# Patient Record
Sex: FEMALE | Race: WHITE
[De-identification: names, ages, dates, MRNs, and addresses within clinical notes are randomized per-mention and may not be internally consistent; named-entity substitution may affect disease eponyms.]

---

## 2018-03-31 ENCOUNTER — HOSPITAL ENCOUNTER (INPATIENT)
Dept: HOSPITAL 62 - ER | Age: 63
LOS: 3 days | Discharge: HOME HEALTH | DRG: 494 | End: 2018-04-03
Attending: ORTHOPAEDIC SURGERY | Admitting: ORTHOPAEDIC SURGERY
Payer: COMMERCIAL

## 2018-03-31 DIAGNOSIS — Z79.01: ICD-10-CM
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
## 2018-03-31 DIAGNOSIS — I49.3: ICD-10-CM

## 2018-03-31 DIAGNOSIS — Z23: ICD-10-CM

## 2018-03-31 DIAGNOSIS — S82.851B: Primary | ICD-10-CM

## 2018-03-31 DIAGNOSIS — Z87.891: ICD-10-CM
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
## 2018-03-31 DIAGNOSIS — K21.9: ICD-10-CM

## 2018-03-31 LAB
ADD MANUAL DIFF: NO
ALBUMIN SERPL-MCNC: 4.9 G/DL (ref 3.5–5)
ALP SERPL-CCNC: 86 U/L (ref 38–126)
ALT SERPL-CCNC: 52 U/L (ref 9–52)
ANION GAP SERPL CALC-SCNC: 15 MMOL/L (ref 5–19)
APTT BLD: 25.8 SEC (ref 23.5–35.8)
AST SERPL-CCNC: 38 U/L (ref 14–36)
BASOPHILS # BLD AUTO: 0.1 10^3/UL (ref 0–0.2)
BASOPHILS NFR BLD AUTO: 0.5 % (ref 0–2)
BILIRUB DIRECT SERPL-MCNC: 0.2 MG/DL (ref 0–0.4)
BILIRUB SERPL-MCNC: 0.3 MG/DL (ref 0.2–1.3)
BUN SERPL-MCNC: 14 MG/DL (ref 7–20)
CALCIUM: 8.9 MG/DL (ref 8.4–10.2)
CHLORIDE SERPL-SCNC: 102 MMOL/L (ref 98–107)
CO2 SERPL-SCNC: 26 MMOL/L (ref 22–30)
EOSINOPHIL # BLD AUTO: 0.1 10^3/UL (ref 0–0.6)
EOSINOPHIL NFR BLD AUTO: 1 % (ref 0–6)
ERYTHROCYTE [DISTWIDTH] IN BLOOD BY AUTOMATED COUNT: 12.7 % (ref 11.5–14)
GLUCOSE SERPL-MCNC: 119 MG/DL (ref 75–110)
HCT VFR BLD CALC: 40.9 % (ref 36–47)
HGB BLD-MCNC: 13.8 G/DL (ref 12–15.5)
INR PPP: 0.94
LYMPHOCYTES # BLD AUTO: 2.5 10^3/UL (ref 0.5–4.7)
LYMPHOCYTES NFR BLD AUTO: 20 % (ref 13–45)
MCH RBC QN AUTO: 31.3 PG (ref 27–33.4)
MCHC RBC AUTO-ENTMCNC: 33.8 G/DL (ref 32–36)
MCV RBC AUTO: 93 FL (ref 80–97)
MONOCYTES # BLD AUTO: 0.9 10^3/UL (ref 0.1–1.4)
MONOCYTES NFR BLD AUTO: 7.3 % (ref 3–13)
NEUTROPHILS # BLD AUTO: 8.7 10^3/UL (ref 1.7–8.2)
NEUTS SEG NFR BLD AUTO: 71.2 % (ref 42–78)
PLATELET # BLD: 251 10^3/UL (ref 150–450)
POTASSIUM SERPL-SCNC: 4.6 MMOL/L (ref 3.6–5)
PROT SERPL-MCNC: 7.4 G/DL (ref 6.3–8.2)
PROTHROMBIN TIME: 13.2 SEC (ref 11.4–15.4)
RBC # BLD AUTO: 4.42 10^6/UL (ref 3.72–5.28)
SODIUM SERPL-SCNC: 143.3 MMOL/L (ref 137–145)
TOTAL CELLS COUNTED % (AUTO): 100 %
WBC # BLD AUTO: 12.2 10^3/UL (ref 4–10.5)

## 2018-03-31 PROCEDURE — 96365 THER/PROPH/DIAG IV INF INIT: CPT

## 2018-03-31 PROCEDURE — C1769 GUIDE WIRE: HCPCS

## 2018-03-31 PROCEDURE — 99285 EMERGENCY DEPT VISIT HI MDM: CPT

## 2018-03-31 PROCEDURE — 0QSJ04Z REPOSITION RIGHT FIBULA WITH INTERNAL FIXATION DEVICE, OPEN APPROACH: ICD-10-PCS | Performed by: ORTHOPAEDIC SURGERY

## 2018-03-31 PROCEDURE — 85027 COMPLETE CBC AUTOMATED: CPT

## 2018-03-31 PROCEDURE — 82550 ASSAY OF CK (CPK): CPT

## 2018-03-31 PROCEDURE — 85730 THROMBOPLASTIN TIME PARTIAL: CPT

## 2018-03-31 PROCEDURE — 83735 ASSAY OF MAGNESIUM: CPT

## 2018-03-31 PROCEDURE — 96376 TX/PRO/DX INJ SAME DRUG ADON: CPT

## 2018-03-31 PROCEDURE — 80048 BASIC METABOLIC PNL TOTAL CA: CPT

## 2018-03-31 PROCEDURE — 3E0234Z INTRODUCTION OF SERUM, TOXOID AND VACCINE INTO MUSCLE, PERCUTANEOUS APPROACH: ICD-10-PCS

## 2018-03-31 PROCEDURE — C1713 ANCHOR/SCREW BN/BN,TIS/BN: HCPCS

## 2018-03-31 PROCEDURE — 90471 IMMUNIZATION ADMIN: CPT

## 2018-03-31 PROCEDURE — 90715 TDAP VACCINE 7 YRS/> IM: CPT

## 2018-03-31 PROCEDURE — 0QSG04Z REPOSITION RIGHT TIBIA WITH INTERNAL FIXATION DEVICE, OPEN APPROACH: ICD-10-PCS | Performed by: ORTHOPAEDIC SURGERY

## 2018-03-31 PROCEDURE — 96361 HYDRATE IV INFUSION ADD-ON: CPT

## 2018-03-31 PROCEDURE — 80053 COMPREHEN METABOLIC PANEL: CPT

## 2018-03-31 PROCEDURE — 96375 TX/PRO/DX INJ NEW DRUG ADDON: CPT

## 2018-03-31 PROCEDURE — 93010 ELECTROCARDIOGRAM REPORT: CPT

## 2018-03-31 PROCEDURE — 94799 UNLISTED PULMONARY SVC/PX: CPT

## 2018-03-31 PROCEDURE — 82553 CREATINE MB FRACTION: CPT

## 2018-03-31 PROCEDURE — 85610 PROTHROMBIN TIME: CPT

## 2018-03-31 PROCEDURE — 36415 COLL VENOUS BLD VENIPUNCTURE: CPT

## 2018-03-31 PROCEDURE — 85025 COMPLETE CBC W/AUTO DIFF WBC: CPT

## 2018-03-31 PROCEDURE — 93005 ELECTROCARDIOGRAM TRACING: CPT

## 2018-03-31 PROCEDURE — 01480 ANES OPEN PX LOWER L/A/F NOS: CPT

## 2018-03-31 PROCEDURE — 84484 ASSAY OF TROPONIN QUANT: CPT

## 2018-03-31 NOTE — EKG REPORT
SEVERITY:- ABNORMAL ECG -

SINUS TACHYCARDIA

VENTRICULAR TRIGEMINY

LEFT AXIS DEVIATION

MINIMAL ST DEPRESSION

:

Confirmed by: Alexis Thomson MD 31-Mar-2018 22:06:52

## 2018-03-31 NOTE — RADIOLOGY REPORT (SQ)
EXAM DESCRIPTION:  ANKLE RIGHT AP/LATERAL



COMPLETED DATE/TIME:  3/31/2018 5:50 pm



REASON FOR STUDY:  fall



COMPARISON:  None.



NUMBER OF VIEWS:  Three views.



TECHNIQUE:  AP, lateral, and oblique radiographic images acquired of the right ankle.



LIMITATIONS:  None.



FINDINGS:  There is evidence of a comminuted fracture of the distal right fibula with angulation conv
ex anteriorly at the fracture site and overlap proximal distal fracture fragments.  There is evidence
 of a fracture of the right lateral malleolus and posterior malleolus with posterolateral dislocation
 of right ankle.  Enthesophyte for Achilles attachment.  Plantar calcaneal bone spur.



IMPRESSION:  There is evidence of right posterior malleolar and medial malleolar fracture with commin
uted fracture distal right fibula.  Posterolateral dislocation of the right ankle.  Plantar calcaneal
 bone spur.



TECHNICAL DOCUMENTATION:  JOB ID:  [id number]

[id number]

 2011 [employer] All Rights Reserved



Reading location - IP/workstation name: [email or web address]

## 2018-03-31 NOTE — ER DOCUMENT REPORT
ED Extremity Problem, Lower





- General


Stated Complaint: FALL,ANKLE INJURY


Time Seen by Provider: 18 17:31


Notes: 





62-year-old female.  Tripped going down stairs.  Twisted her ankle.  Has a 

fracture dislocation obvious open fracture of the right ankle.  Last food was 

approximately 2 hours prior to fall.  Has been drinking today.  Does not smoke.

  Previous history is consistent with  and ovarian cyst.  Patient 

denies any other injuries at this time.  Did not fall head over heels simply 

twisted her ankle and fell down.





- HPI


Location: Ankle


Where: Home


Onset/Duration: Sudden


Pain Level: 5


Context: Fell, Laceration





- Related Data


Allergies/Adverse Reactions: 


 





No Known Allergies Allergy (Unverified 18 17:41)


 











Past Medical History





- General


Information source: Patient





- Social History


Smoking Status: Never Smoker


Frequency of alcohol use: None


Drug Abuse: None


Lives with: Family


Family History: Reviewed & Not Pertinent





- Medical History


Medical History: Negative


Past Surgical History: Reports: Hx  Section





- Immunizations


Immunizations up to date: No





Review of Systems





- Review of Systems


Constitutional: No symptoms reported


EENT: No symptoms reported


Cardiovascular: No symptoms reported


Respiratory: No symptoms reported


Gastrointestinal: No symptoms reported


Genitourinary: No symptoms reported


Female Genitourinary: No symptoms reported


Musculoskeletal: See HPI, Joint pain


Skin: See HPI, Other - Laceration


Hematologic/Lymphatic: No symptoms reported


Neurological/Psychological: No symptoms reported





Physical Exam





- Vital signs


Vitals: 


 











Pulse Resp BP Pulse Ox


 


 101 H  20   170/80 H  98 


 


 18 17:41  18 17:41  18 17:41  18 17:41











Interpretation: Tachycardic





- General


General appearance: Appears well, Alert





- HEENT


Head: Normocephalic, Atraumatic


Eyes: Normal


Pupils: PERRL





- Respiratory


Respiratory status: No respiratory distress


Chest status: Nontender


Breath sounds: Normal


Chest palpation: Normal





- Cardiovascular


Rhythm: Tachycardia


Heart sounds: Normal auscultation


Murmur: No





- Abdominal


Inspection: Normal


Distension: No distension


Bowel sounds: Normal


Tenderness: Nontender


Organomegaly: No organomegaly





- Back


Back: Normal, Nontender





- Extremities


General upper extremity: Normal inspection, Nontender, Normal color, Normal ROM

, Normal temperature


General lower extremity: Other - Patient has good pulses of the right lower 

extremity dorsalis pedis and posterior tibialis.  There is an obvious 2 cm 

laceration on the medial aspect of the right ankle with talus pushing 

underneath the skin.  Tenderness medial and laterally as well as posteriorly.  

Obvious deformity peer.  No: Tunde's sign





- Neurological


Neuro grossly intact: Yes


Cognition: Normal


Orientation: AAOx4


Bela Coma Scale Eye Opening: Spontaneous


Bela Coma Scale Verbal: Oriented


Bela Coma Scale Motor: Obeys Commands


[place] Coma Scale Total: 15


Speech: Normal


Motor strength normal: LUE, RUE, LLE, RLE


Sensory: Normal





- Psychological


Associated symptoms: Normal affect, Normal mood





- Skin


Skin Temperature: Warm


Skin Moisture: Dry


Skin Color: Normal





Course





- Re-evaluation


Re-evalutation: 





18 18:58





Laboratory











  18





  17:33 17:33 17:33


 


WBC  12.2 H  


 


RBC  4.42  


 


Hgb  13.8  


 


Hct  40.9  


 


MCV  93  


 


MCH  31.3  


 


MCHC  33.8  


 


RDW  12.7  


 


Plt Count  251  


 


Seg Neutrophils %  71.2  


 


Lymphocytes %  20.0  


 


Monocytes %  7.3  


 


Eosinophils %  1.0  


 


Basophils %  0.5  


 


Absolute Neutrophils  8.7 H  


 


Absolute Lymphocytes  2.5  


 


Absolute Monocytes  0.9  


 


Absolute Eosinophils  0.1  


 


Absolute Basophils  0.1  


 


PT   13.2 


 


INR   0.94 


 


APTT   25.8 


 


Sodium    143.3


 


Potassium    4.6


 


Chloride    102


 


Carbon Dioxide    26


 


Anion Gap    15


 


BUN    14


 


Creatinine    0.62


 


Est GFR ( Amer)    > 60


 


Est GFR (Non-Af Amer)    > 60


 


Glucose    119 H


 


Calcium    8.9


 


Total Bilirubin    0.3


 


Direct Bilirubin    0.2


 


Neonat Total Bilirubin    Not Reportable


 


Neonat Direct Bilirubin    Not Reportable


 


Neonat Indirect Bili    Not Reportable


 


AST    38 H


 


ALT    52


 


Alkaline Phosphatase    86


 


Total Protein    7.4


 


Albumin    4.9














 





Ankle X-Ray  18 17:32


IMPRESSION:  There is evidence of right posterior malleolar and medial 

malleolar fracture with comminuted fracture distal right fibula.  

Posterolateral dislocation of the right ankle.  Plantar calcaneal bone spur.


 








Patient has open comminuted fracture of the ankle.  Consulted Dr. Forrest who 

will be in to see patient shortly.  Patient getting IV pain medication.  

Patient received antibiotics and tetanus update.  IV fluids.  Zofran.





- Vital Signs


Vital signs: 


 











Temp Pulse Resp BP Pulse Ox


 


    101 H  20   170/80 H  98 


 


    18 17:41  18 17:41  18 17:41  18 17:41














- Laboratory


Result Diagrams: 


 18 17:33





 18 17:33


Laboratory results interpreted by me: 


 











  18





  17:33 17:33


 


WBC  12.2 H 


 


Absolute Neutrophils  8.7 H 


 


Glucose   119 H


 


AST   38 H














Discharge





- Discharge


Clinical Impression: 


Fracture of ankle, trimalleolar, right, open


Qualifiers:


 Encounter type: initial encounter Open fracture type: open type I or II 

Qualified Code(s): S82.851B - Displaced trimalleolar fracture of right lower leg

, initial encounter for open fracture type I or II





Condition: Good


Disposition: ADMITTED AS INPATIENT


Unit Admitted: Surgical Floor - Brian Malik


Referrals: 


OTTO BASHIR PA-C [Primary Care Provider] - Follow up as needed

## 2018-03-31 NOTE — PDOC H&P
History of Present Illness


Admission Date/PCP: 


  





  OTTO BASHIR PA-C





Patient complains of: Right ankle injury


History of Present Illness: 


CELINE JACOME is a 62 year old female who tripped going downstairs.  Patient 

had notable deformity of her right lower extremity with an open wound.  She was 

brought to the emergency room where x-rays demonstrated open fracture 

dislocation of the ankle.  Patient immediately received antibiotics the wound 

was cleansed and a soft wet-to-dry dressing placed.  Patient states her pain 

has improved after pain medication but has considerable pain with any attempted 

motion.  Notes tingling along the tips of the toes.  Pain 5/5.








Past Medical History


Psychiatric Medical History: Reports: Depression





Past Surgical History


Past Surgical History: Reports:  Section





Social History


Lives with: Family


Smoking Status: Never Smoker





Family History


Family History: Reviewed & Not Pertinent


Parental Family History Reviewed: No


Children Family History Reviewed: No


Sibling(s) Family History Reviewed.: No





Medication/Allergy


Allergies/Adverse Reactions: 


 





No Known Allergies Allergy (Unverified 18 17:41)


 











Review of Systems


Constitutional: ABSENT: chills, fever(s), headache(s), weight gain, weight loss


Eyes: ABSENT: visual disturbances


Ears: ABSENT: hearing changes


Nose, Mouth, and Throat: PRESENT: other - History of allergies


Cardiovascular: ABSENT: chest pain, dyspnea on exertion, edema, orthropnea, 

palpitations


Respiratory: ABSENT: cough, hemoptysis


Gastrointestinal: ABSENT: abdominal pain, constipation, diarrhea, hematemesis, 

hematochezia, nausea, vomiting


Genitourinary: ABSENT: dysuria, hematuria


Musculoskeletal: PRESENT: as per HPI


Integumentary: ABSENT: rash, wounds


Neurological: ABSENT: abnormal gait, abnormal speech, confusion, dizziness, 

focal weakness, syncope


Psychiatric: ABSENT: anxiety, depression, homidical ideation, suicidal ideation


Endocrine: ABSENT: cold intolerance, heat intolerance, menstrual abnormalities, 

polydipsia, polyuria


Hematologic/Lymphatic: ABSENT: easy bleeding, easy bruising, lymphadenopathy





Physical Exam


Vital Signs: 


 











Temp Pulse Resp BP Pulse Ox


 


    101 H  20   170/80 H  98 


 


    18 17:41  18 17:41  18 17:41  18 17:41








 Intake & Output











 18





 06:59 06:59 06:59


 


Weight   79.1 kg











General appearance: PRESENT: no acute distress, well-developed, well-nourished


Head exam: PRESENT: atraumatic, normocephalic


Eye exam: PRESENT: conjunctiva pink, EOMI, PERRLA.  ABSENT: scleral icterus


Ear exam: PRESENT: normal external ear exam


Mouth exam: PRESENT: moist, tongue midline


Neck exam: PRESENT: full ROM.  ABSENT: carotid bruit, JVD, lymphadenopathy, 

thyromegaly


Cardiovascular exam: PRESENT: RRR.  ABSENT: diastolic murmur, rubs, systolic 

murmur


Pulses: PRESENT: normal dorsalis pedis pul, +2 pedal pulses bilateral


Vascular exam: PRESENT: normal capillary refill


GI/Abdominal exam: PRESENT: normal bowel sounds, soft.  ABSENT: distended, 

guarding, mass, organolmegaly, rebound, tenderness


Rectal exam: PRESENT: deferred


Musculoskeletal exam: PRESENT: other - Right ankle: Exposed bone medially with 

6 cm transverse laceration.  No gross contamination.  Dorsalis pedis pulse one 

plus.  Cap refill less than 2 seconds.  Hypoesthesia along the dorsum of the 

foot.  Intact flexion extension of the toes.


Neurological exam: PRESENT: alert, awake, oriented to person, oriented to place

, oriented to time, oriented to situation, CN II-XII grossly intact.  ABSENT: 

motor sensory deficit


Psychiatric exam: PRESENT: appropriate affect, normal mood.  ABSENT: homicidal 

ideation, suicidal ideation


Skin exam: PRESENT: dry, intact, warm.  ABSENT: cyanosis, rash





Results


Laboratory Results: 


 





 18 17:33 





 18 17:33 





 











  18





  17:33 17:33


 


WBC  12.2 H 


 


RBC  4.42 


 


Hgb  13.8 


 


Hct  40.9 


 


MCV  93 


 


MCH  31.3 


 


MCHC  33.8 


 


RDW  12.7 


 


Plt Count  251 


 


Seg Neutrophils %  71.2 


 


Lymphocytes %  20.0 


 


Monocytes %  7.3 


 


Eosinophils %  1.0 


 


Basophils %  0.5 


 


Absolute Neutrophils  8.7 H 


 


Absolute Lymphocytes  2.5 


 


Absolute Monocytes  0.9 


 


Absolute Eosinophils  0.1 


 


Absolute Basophils  0.1 


 


Sodium   143.3


 


Potassium   4.6


 


Chloride   102


 


Carbon Dioxide   26


 


Anion Gap   15


 


BUN   14


 


Creatinine   0.62


 


Est GFR ( Amer)   > 60


 


Est GFR (Non-Af Amer)   > 60


 


Glucose   119 H


 


Calcium   8.9


 


Total Bilirubin   0.3


 


AST   38 H


 


ALT   52


 


Alkaline Phosphatase   86


 


Total Protein   7.4


 


Albumin   4.9











Impressions: 


 





Ankle X-Ray  18 17:32


IMPRESSION:  There is evidence of right posterior malleolar and medial 

malleolar fracture with comminuted fracture distal right fibula.  

Posterolateral dislocation of the right ankle.  Plantar calcaneal bone spur.


 











Status: Image reviewed by me - I have reviewed patient's radiographs 

demonstrate trimalleolar fracture dislocation of the right ankle with small 

medial malleolar fragment and distal fibula fracture.





Assessment & Plan





- Diagnosis


(1) Fracture of ankle, trimalleolar, right, open


Qualifiers: 


   Encounter type: initial encounter   Open fracture type: open type I or II   

Qualified Code(s): S82.851B - Displaced trimalleolar fracture of right lower leg

, initial encounter for open fracture type I or II   


Is this a current diagnosis for this admission?: Yes   


Plan: 


Patient sustained a grade II open fracture dislocation of her ankle.  Today we 

discussed treatment options I recommended urgent operative intervention which 

includes irrigation and debridement of the right ankle with open reduction 

internal fixation versus placement of external fixator.  Risks and benefits of 

the surgical procedure have been explained to the patient specific risks 

including infection which patient is high risk given the open nature, 

posttraumatic arthritis, postoperative pain, neurovascular injury and need for 

future surgical intervention.  Risks and benefits have been explained, patient 

has verbalized understanding consented for the procedure.  Patient will be 

admitted for 72 hours and received IV antibiotics.  Pending intraoperative 

findings will proceed with open reduction internal fixation versus external 

fixator.  Patient understands if external fixator is placed she will require 

future surgical intervention for definitive fixation.

## 2018-03-31 NOTE — OPERATIVE REPORT
Operative Report


DATE OF SURGERY: 03/31/18


PREOPERATIVE DIAGNOSIS: Right fracture dislocation trimalleolar ankle fracture 

grade II open


POSTOPERATIVE DIAGNOSIS: Same


OPERATION: Irrigation and debridement grade II open fracture dislocation right 

ankle.  Open reduction internal fixation medial/lateral malleoli.  Syndesmotic 

fixation right ankle


SURGEON: FERCHO SAMUEL


ANESTHESIA: GA


COMPLICATIONS: 





None


ESTIMATED BLOOD LOSS: Minimal


PROCEDURE: 





Indication for above procedure:





62-year-old female who sustained a fall down her steps.  She had a notable 

deformity with open wound.  She was brought to the emergency room where she was 

found to have a open fracture dislocation of her ankle.  Patient received 

antibiotics in the emergency room the wound was cleansed and a wet-to-dry 

dressing placed.  I discussed treatment options with the patient given the 

severity of her injury decision was made to proceed with operative intervention 

which included ORIF versus external fixation.  All questions were answered 

risks and benefits were explained patient verbalized understanding consented 

for the procedure.





Procedure In Detail:





Patient was seen and evaluated in the preoperative holding area.  The RIGHT 

lower extremity was initialized and marked.  Patient received Unasyn for 

bacterial prophylaxis in the emergency room.  Patient was taken back to the 

operative room where transferred to the operative table and placed under 

general anesthesia.  Once they were adequately anesthetized a nonsterile 

tourniquet was placed on the lower extremity.  A surgical team debriefing was 

performed ensuring all instrumentation was available, the surgical procedure 

was discussed with possible concerns reviewed.  I prescribed with Betadine was 

performed to the lower extremity.  The lower extremity was prepped with 

chlorhexidine and alcohol and draped in a sterile fashion.   A timeout was done 

identifying correct patient, procedure and extremity everyone in attendance 

agree with this and verbalized no concerns. The extremity was exsanguinated the 

tourniquet was inflated to 300 mmHg.





Medial wound was then irrigated with pulse lavage bacitracin and then gravity 

irrigation.  Exploration demonstrated exposed cancellus bone of the medial 

malleolus medial malleolar fragment was approximately 1 cm x 1 cm.  The skin 

edges were then debrided any necrotic or nonviable skin removed.  The exposed 

tibia was then curetted and any nonviable bone excised.  Given the acuity of 

patient's injury, minimal swelling and no evidence of comorbidities decision 

was made to proceed with definitive fixation.





Longitudinal skin incision was made along the lateral malleolus/distal fibula 

blunt dissection was performed proximally and the superficial peroneal nerve 

was identified and protected.  I then carefully elevated soft tissue from the 

fracture site.  The fracture was then reduced under direct visualization.  

Interfragmentary screw was placed obtaining good interfragmentary compression.  

A [place] one third tubular plate was then secured proximally and distally C 

arm fluoroscopy demonstrated restoration of fibular length and appropriate 

placement of the plate.  I then completed plate fixation and distally.  A 

cotton test was then performed which demonstrated tibia-fibula clear space 

widening thus decision was made to proceed with syndesmotic fixation.





Aiming from a posterior to anterior direction at 45 I placed a 3.5 mm fully 

threaded cortex screw while maintaining syndesmotic reduction.  A second 3.5 mm 

fully threaded cortex screw was placed to provide further stability.  No 

syndesmotic screws obtained 4 cortices of fixation.  There is no evidence of 

syndesmotic widening and a negative cotton test after syndesmotic fixation.  C-

arm fluoroscopy was obtained confirming reduction of the syndesmosis and 

adequate placement of the syndesmotic screws coplanar with the fibula and tibia.





Given the size of patient's small medial malleolar fragment I decided to 

proceed with single screw fixation I do not feel tension band fixation was an 

appropriate choice given patient's open wound and possibly higher risk of 

hardware infection.  The small medial malleolar fragment was then held reduced 

and secured with 2 threaded K wire.  I then placed a partially threaded 4.0 mm 

interfragmentary screw was provided optimal fixation of the fragment.  Once 

fixation was complete there is no evidence of tibia-fibula clear space widening 

or medial space widening with external rotation or cotton test.  No evidence of 

joint subluxation throughout range of motion.





The wounds were once again copiously irrigated with normal saline.  Medial 

wound was closed with interrupted 3-0 nylon suture small area was left open.  

Subcutaneous tissues were closed laterally with 3-0 Vicryl suture.  Skin was 

closed with staples.  20 cc of 0.5% Marcaine without epinephrine was injected 

for postoperative pain control.  Patient was placed in a posterior plaster 

splint maintaining neutral dorsiflexion.  Tourniquet was then deflated.  

Patient had good peripheral perfusion of her digits.





Sponge counts, instrument counts, needle counts counts were correct.  Patient 

was then awoken from anesthesia.  Transferred from the operating room table to 

the operating room stretcher.  There was no intraoperative complications 

patient tolerated procedure well stable to PACU.





Postoperative plan:





Patient will be admitted for 72 hours of IV antibiotics given the open 

fracture.  She was started on Xarelto for DVT prophylaxis.  Will maintain 

nonweightbearing for 3 months given syndesmotic fixation will discuss possible 

hardware removal 3 months postoperative.  Patient will follow-up in 1 week for 

wound check and likely transition to a pneumatic walking boot.

## 2018-03-31 NOTE — RADIOLOGY REPORT (SQ)
EXAM DESCRIPTION:  ANKLE RIGHT COMPLETE



COMPLETED DATE/TIME:  3/31/2018 10:01 pm



REASON FOR STUDY:  ORIF RT ANKLE FX



COMPARISON:  3/31/2018



FLUOROSCOPY TIME:  1.0 minutes

7 images saved to PACS.



TECHNIQUE:  Intra-operative images acquired during surgical procedure to evaluate progress.

NUMBER OF IMAGES: 7



LIMITATIONS:  None.



FINDINGS:  Multiple fluoroscopic spot images of the ankle demonstrate surgical changes consistent wit
h open reduction, internal fixation of previously diagnosed right ankle fractures.  Images are submit
patty for administrative purposes only.  Please refer to the operative report for full details regardin
g this surgery.



IMPRESSION:  IMAGE(S) OBTAINED DURING PROCEDURE.



COMMENT:  Quality :  Final reports for procedures using fluoroscopy that document radiation exp
osure indices, or exposure time and number of fluorographic images (if radiation exposure indices are
 not available)

Please consult full operative report of the attending physician for description of the procedure.



TECHNICAL DOCUMENTATION:  JOB ID:  [id number]

 2011 [employer]- All Rights Reserved



Reading location - IP/workstation name: [email or web address]

## 2018-04-01 LAB
ANION GAP SERPL CALC-SCNC: 9 MMOL/L (ref 5–19)
BUN SERPL-MCNC: 10 MG/DL (ref 7–20)
CALCIUM: 8.1 MG/DL (ref 8.4–10.2)
CHLORIDE SERPL-SCNC: 102 MMOL/L (ref 98–107)
CK MB SERPL-MCNC: 1.24 NG/ML (ref ?–4.55)
CK MB SERPL-MCNC: 1.39 NG/ML (ref ?–4.55)
CK MB SERPL-MCNC: 1.7 NG/ML (ref ?–4.55)
CK SERPL-CCNC: 146 U/L (ref 30–135)
CK SERPL-CCNC: 244 U/L (ref 30–135)
CO2 SERPL-SCNC: 27 MMOL/L (ref 22–30)
ERYTHROCYTE [DISTWIDTH] IN BLOOD BY AUTOMATED COUNT: 12.6 % (ref 11.5–14)
GLUCOSE SERPL-MCNC: 90 MG/DL (ref 75–110)
HCT VFR BLD CALC: 33.7 % (ref 36–47)
HGB BLD-MCNC: 11.5 G/DL (ref 12–15.5)
MCH RBC QN AUTO: 31.4 PG (ref 27–33.4)
MCHC RBC AUTO-ENTMCNC: 34 G/DL (ref 32–36)
MCV RBC AUTO: 92 FL (ref 80–97)
PLATELET # BLD: 189 10^3/UL (ref 150–450)
POTASSIUM SERPL-SCNC: 4.4 MMOL/L (ref 3.6–5)
RBC # BLD AUTO: 3.65 10^6/UL (ref 3.72–5.28)
SODIUM SERPL-SCNC: 138.3 MMOL/L (ref 137–145)
TROPONIN I SERPL-MCNC: < 0.012 NG/ML
WBC # BLD AUTO: 12.1 10^3/UL (ref 4–10.5)

## 2018-04-01 RX ADMIN — OXYCODONE HYDROCHLORIDE SCH MG: 10 TABLET, FILM COATED, EXTENDED RELEASE ORAL at 11:14

## 2018-04-01 RX ADMIN — DEXAMETHASONE SODIUM PHOSPHATE PRN MG: 10 INJECTION INTRAMUSCULAR; INTRAVENOUS at 17:24

## 2018-04-01 RX ADMIN — RIVAROXABAN SCH MG: 10 TABLET, FILM COATED ORAL at 22:54

## 2018-04-01 RX ADMIN — ACETAMINOPHEN PRN MG: 325 TABLET ORAL at 17:16

## 2018-04-01 RX ADMIN — PREGABALIN SCH MG: 75 CAPSULE ORAL at 09:39

## 2018-04-01 RX ADMIN — PREGABALIN SCH MG: 75 CAPSULE ORAL at 17:17

## 2018-04-01 RX ADMIN — FENTANYL CITRATE PRN MCG: 50 INJECTION INTRAMUSCULAR; INTRAVENOUS at 01:39

## 2018-04-01 RX ADMIN — OXYCODONE HYDROCHLORIDE PRN MG: 5 TABLET ORAL at 12:54

## 2018-04-01 RX ADMIN — FENTANYL CITRATE PRN MCG: 50 INJECTION INTRAMUSCULAR; INTRAVENOUS at 05:28

## 2018-04-01 RX ADMIN — CEFAZOLIN SODIUM SCH ML: 2 SOLUTION INTRAVENOUS at 17:23

## 2018-04-01 RX ADMIN — SENNOSIDES, DOCUSATE SODIUM SCH EACH: 50; 8.6 TABLET, FILM COATED ORAL at 17:18

## 2018-04-01 RX ADMIN — SENNOSIDES, DOCUSATE SODIUM SCH EACH: 50; 8.6 TABLET, FILM COATED ORAL at 09:39

## 2018-04-01 RX ADMIN — Medication SCH CAP: at 09:40

## 2018-04-01 RX ADMIN — Medication SCH ML: at 06:37

## 2018-04-01 RX ADMIN — OXYCODONE HYDROCHLORIDE SCH MG: 10 TABLET, FILM COATED, EXTENDED RELEASE ORAL at 22:54

## 2018-04-01 RX ADMIN — CEFAZOLIN SODIUM SCH ML: 2 SOLUTION INTRAVENOUS at 06:41

## 2018-04-01 RX ADMIN — FENTANYL CITRATE PRN MCG: 50 INJECTION INTRAMUSCULAR; INTRAVENOUS at 09:38

## 2018-04-01 RX ADMIN — LANSOPRAZOLE SCH MG: 30 TABLET, ORALLY DISINTEGRATING, DELAYED RELEASE ORAL at 06:37

## 2018-04-01 RX ADMIN — CEFAZOLIN SODIUM SCH ML: 2 SOLUTION INTRAVENOUS at 11:19

## 2018-04-01 RX ADMIN — MORPHINE SULFATE PRN MG: 10 INJECTION INTRAMUSCULAR; INTRAVENOUS; SUBCUTANEOUS at 11:18

## 2018-04-01 RX ADMIN — Medication SCH ML: at 13:43

## 2018-04-01 RX ADMIN — Medication SCH ML: at 22:56

## 2018-04-01 RX ADMIN — MORPHINE SULFATE PRN MG: 10 INJECTION INTRAMUSCULAR; INTRAVENOUS; SUBCUTANEOUS at 13:40

## 2018-04-01 RX ADMIN — CEFAZOLIN SODIUM SCH ML: 2 SOLUTION INTRAVENOUS at 00:47

## 2018-04-01 NOTE — PDOC PROGRESS REPORT
Subjective


Progress Note for:: 04/01/18


Subjective:: 





62-year-old white female postop day 0 open reduction internal fixation right 

open trimalleolar ankle fracture dislocation.  Patient lying comfortably in 

hospital bed this morning with right lower extremity elevated on multiple 

pillows.  Her postoperative dressing is clean dry and intact.  She reports it 

feels "tight".  Denies tingling or numbness at this time and notes she is still 

in pain.


Reason For Visit: 


RIGHT OPEN RIMALLEOLAR ANKLE FRACTURE








Physical Exam


Vital Signs: 


 











Temp Pulse Resp BP Pulse Ox


 


 37.2 C   83   16   144/68 H  95 


 


 04/01/18 08:22  04/01/18 08:22  04/01/18 08:22  04/01/18 08:22  04/01/18 08:22








 Intake & Output











 03/31/18 04/01/18 04/02/18





 06:59 06:59 06:59


 


Intake Total  5573 


 


Output Total  4120 


 


Balance  1453 











General appearance: PRESENT: no acute distress, well-developed, well-nourished


Head exam: PRESENT: atraumatic, normocephalic


Eye exam: PRESENT: EOMI


Mouth exam: PRESENT: moist


Respiratory exam: PRESENT: unlabored


Pulses: PRESENT: normal dorsalis pedis pul, +2 pedal pulses bilateral


Vascular exam: PRESENT: normal capillary refill


Additional comments: 





Patient lying recumbent in hospital bed with right lower extremity elevated on 

multiple pillows.  Her postoperative compression dressing and splint are clean 

dry and intact.  These are left in place.  She is nontender to palpation 

however notes pain with initiation of motion.  She denies tingling and 

numbness.  She has less than 2 seconds capillary refill to toes on foot of 

right lower extremity with moderate pedal edema.  No sensory motor deficits.


Additional comments: 





Patient has not yet worked with physical therapy services however she will 

maintain a nonweightbearing status on right lower extremity.  She will work 

with physical therapy throughout her stay in the hospital to work towards 

ambulation with nonweightbearing status on right lower extremity and transfers 

from bed to chair at the bedside.


Neurological exam: PRESENT: alert, awake, oriented to person, oriented to place

, oriented to time, oriented to situation, CN II-XII grossly intact.  ABSENT: 

motor sensory deficit


Psychiatric exam: PRESENT: appropriate affect, normal mood.  ABSENT: homicidal 

ideation, suicidal ideation


Skin exam: PRESENT: dry, intact, warm.  ABSENT: cyanosis, rash





Results


Laboratory Results: 


 





 04/01/18 06:40 





 04/01/18 06:40 





 











  04/01/18 04/01/18 04/01/18





  00:30 06:40 06:40


 


WBC   12.1 H 


 


RBC   3.65 L 


 


Hgb   11.5 L D 


 


Hct   33.7 L 


 


MCV   92 


 


MCH   31.4 


 


MCHC   34.0 


 


RDW   12.6 


 


Plt Count   189 


 


Sodium    138.3


 


Potassium    4.4


 


Chloride    102


 


Carbon Dioxide    27


 


Anion Gap    9


 


BUN    10


 


Creatinine    0.61


 


Est GFR ( Amer)    > 60


 


Est GFR (Non-Af Amer)    > 60


 


Glucose    90


 


Calcium    8.1 L


 


Magnesium  1.7  








 











  04/01/18 04/01/18 04/01/18





  00:30 00:30 06:40


 


Creatine Kinase  146 H   244 H


 


CK-MB (CK-2)   1.24 


 


Troponin I   < 0.012 














  04/01/18





  06:40


 


Creatine Kinase 


 


CK-MB (CK-2)  1.70


 


Troponin I  < 0.012











Impressions: 


 





Ankle X-Ray  03/31/18 17:32


IMPRESSION:  There is evidence of right posterior malleolar and medial 

malleolar fracture with comminuted fracture distal right fibula.  

Posterolateral dislocation of the right ankle.  Plantar calcaneal bone spur.


 














Assessment & Plan





- Diagnosis


(1) Fracture of ankle, trimalleolar, right, open


Qualifiers: 


   Encounter type: initial encounter   Open fracture type: open type I or II   

Qualified Code(s): S82.851B - Displaced trimalleolar fracture of right lower leg

, initial encounter for open fracture type I or II   


Is this a current diagnosis for this admission?: Yes   


Plan: 


62-year-old white female postop day 0 from open reduction internal fixation 

right open trimalleolar fracture with dislocation.  Patient has been having 

trouble with pain control.  She notes the fentanyl is not alleviating her pain.

  Therefore an order was placed for 2 mg IV morphine every 2 hours as needed 

for pain if fentanyl continues to have no effect on pain.  Patient's nurses 

were informed of this change.  Otherwise patient has no sensory or motor 

deficits in postop splint and dressing are clean dry and intact.  We will 

otherwise continue:





1.  DVT prophylaxis


2.  Analgesic medication


3.  Physical therapy








(2) PVCs (premature ventricular contractions)


Is this a current diagnosis for this admission?: Yes   


Plan: 


Initial concern intraoperatively and postoperatively for multiple PVCs.  

Further workup was ordered by hospitalist service and determined the this was a 

regularly irregular rhythm of PVCs with trigeminy.  A full cardiac workup was 

ordered however enzymes and electrolytes were negative for cardiac injury or 

ischemia.  No need for intervention at this time she will continue to be 

monitored by hospitalist service.

## 2018-04-01 NOTE — EKG REPORT
SEVERITY:- OTHERWISE NORMAL ECG -

SINUS TACHYCARDIA

LEFT AXIS DEVIATION

:

Confirmed by: Alexis Thomson MD 01-Apr-2018 09:26:22

## 2018-04-01 NOTE — PDOC CONSULTATION
Consultation


Consult Date: 18


Attending physician:: GURPREET CASTANEDA


Consult reason:: PVCs





History of Present Illness


Admission Date/PCP: 


  18 19:52





  OTTO BASHIR PA-C





History of Present Illness: 


CELINE JACOME is a 62 year old female who tripped going downstairs.  In the OR 

she is noted to have PVCs.





Patient had notable deformity of her right lower extremity with an open wound.  

She was brought to the emergency room where x-rays demonstrated open fracture 

dislocation of the ankle.  Patient immediately received antibiotics the wound 

was cleansed and a soft wet-to-dry dressing placed.  Patient states her pain 

has improved after pain medication but has considerable pain with any attempted 

motion.  Notes tingling along the tips of the toes.  Pain 5/5.








Past Medical History


GI Medical History: Reports: Gastroesophageal Reflux Disease





Past Surgical History


Past Surgical History: Reports:  Section





Social History


Information Source: [facility] Records


Lives with: Family


Smoking Status: Former Smoker


Last Time Smoked: 


Frequency of Alcohol Use: Heavy


Hx Recreational Drug Use: No


Hx Prescription Drug Abuse: No





- Advance Directive


Resuscitation Status: Full Code





Family History


Family History: Reviewed & Not Pertinent


Parental Family History Reviewed: No


Children Family History Reviewed: No


Sibling(s) Family History Reviewed.: No





Medication/Allergy


Home Medications: 








Citalopram Hydrobromide [Celexa 20 mg Tablet] 20 mg PO DAILY 18 


Clindamycin HCl 300 mg PO Q8 #21 capsule 18 


Esomeprazole Magnesium [Nexium 24Hr] 20 mg PO DAILY 18 


Mv,Calcium,Min/Iron/Folic/Vitk [Multi For Her Tablet] 1 tab PO DAILY 18 


Oxycodone HCl/Acetaminophen [Percocet 7.5-325 mg Tablet] 1 - 2 tab PO ASDIR PRN 

#40 tab 18 


Rivaroxaban [Xarelto 10 mg Tablet] 10 mg PO QHS #17 tablet 18 








Allergies/Adverse Reactions: 


 





No Known Allergies Allergy (Unverified 18 17:41)


 











Physical Exam


Vital Signs: 


 











Temp Pulse Resp BP Pulse Ox


 


 99.0 F   101 H  16   125/69   96 


 


 18 03:28  18 03:28  18 03:28  18 03:28  18 03:28








 Intake & Output











 18





 11:59 11:59 11:59


 


Intake Total   4898


 


Output Total   3220


 


Balance   1678














Results


Laboratory Results: 


 











  18





  00:30


 


Magnesium  1.7








 











  18





  00:30 00:30


 


Creatine Kinase  146 H 


 


CK-MB (CK-2)   1.24


 


Troponin I   < 0.012











Impressions: 


 





Ankle X-Ray  18 17:32


IMPRESSION:  There is evidence of right posterior malleolar and medial 

malleolar fracture with comminuted fracture distal right fibula.  

Posterolateral dislocation of the right ankle.  Plantar calcaneal bone spur.


 














Assessment & Plan





- Diagnosis


(1) PVCs (premature ventricular contractions)


Is this a current diagnosis for this admission?: Yes   


Plan: 


PVCs noted in the OR, reevaluation of her EKG reveal PVCs occurring with 

trigeminy which is benign.  Evaluation of cardiac enzymes and electrolytes are 

unremarkable.  Benign condition, no further workup.  Thank you for the 

opportunity.








- Time


Time Spent: 30 to 50 Minutes

## 2018-04-02 LAB
ERYTHROCYTE [DISTWIDTH] IN BLOOD BY AUTOMATED COUNT: 12.8 % (ref 11.5–14)
HCT VFR BLD CALC: 35.1 % (ref 36–47)
HGB BLD-MCNC: 11.8 G/DL (ref 12–15.5)
MCH RBC QN AUTO: 31.1 PG (ref 27–33.4)
MCHC RBC AUTO-ENTMCNC: 33.7 G/DL (ref 32–36)
MCV RBC AUTO: 92 FL (ref 80–97)
PLATELET # BLD: 186 10^3/UL (ref 150–450)
RBC # BLD AUTO: 3.8 10^6/UL (ref 3.72–5.28)
WBC # BLD AUTO: 11.8 10^3/UL (ref 4–10.5)

## 2018-04-02 RX ADMIN — OXYCODONE HYDROCHLORIDE PRN MG: 5 TABLET ORAL at 06:07

## 2018-04-02 RX ADMIN — PREGABALIN SCH MG: 75 CAPSULE ORAL at 18:40

## 2018-04-02 RX ADMIN — LANSOPRAZOLE SCH MG: 30 TABLET, ORALLY DISINTEGRATING, DELAYED RELEASE ORAL at 06:07

## 2018-04-02 RX ADMIN — MORPHINE SULFATE PRN MG: 10 INJECTION INTRAMUSCULAR; INTRAVENOUS; SUBCUTANEOUS at 03:24

## 2018-04-02 RX ADMIN — OXYCODONE HYDROCHLORIDE SCH MG: 10 TABLET, FILM COATED, EXTENDED RELEASE ORAL at 11:58

## 2018-04-02 RX ADMIN — Medication SCH CAP: at 11:59

## 2018-04-02 RX ADMIN — ACETAMINOPHEN PRN MG: 325 TABLET ORAL at 08:25

## 2018-04-02 RX ADMIN — CEFAZOLIN SODIUM SCH ML: 2 SOLUTION INTRAVENOUS at 00:27

## 2018-04-02 RX ADMIN — OXYCODONE HYDROCHLORIDE SCH MG: 10 TABLET, FILM COATED, EXTENDED RELEASE ORAL at 22:16

## 2018-04-02 RX ADMIN — RIVAROXABAN SCH MG: 10 TABLET, FILM COATED ORAL at 22:16

## 2018-04-02 RX ADMIN — Medication SCH ML: at 22:16

## 2018-04-02 RX ADMIN — CEFAZOLIN SODIUM SCH ML: 2 SOLUTION INTRAVENOUS at 19:14

## 2018-04-02 RX ADMIN — ACETAMINOPHEN PRN MG: 325 TABLET ORAL at 19:13

## 2018-04-02 RX ADMIN — CEFAZOLIN SODIUM SCH ML: 2 SOLUTION INTRAVENOUS at 11:58

## 2018-04-02 RX ADMIN — CITALOPRAM HYDROBROMIDE SCH MG: 20 TABLET ORAL at 11:58

## 2018-04-02 RX ADMIN — MORPHINE SULFATE PRN MG: 10 INJECTION INTRAMUSCULAR; INTRAVENOUS; SUBCUTANEOUS at 08:25

## 2018-04-02 RX ADMIN — Medication SCH ML: at 06:07

## 2018-04-02 RX ADMIN — PREGABALIN SCH MG: 75 CAPSULE ORAL at 11:58

## 2018-04-02 RX ADMIN — CEFAZOLIN SODIUM SCH ML: 2 SOLUTION INTRAVENOUS at 23:46

## 2018-04-02 RX ADMIN — CEFAZOLIN SODIUM SCH ML: 2 SOLUTION INTRAVENOUS at 06:07

## 2018-04-02 RX ADMIN — DEXAMETHASONE SODIUM PHOSPHATE PRN MG: 10 INJECTION INTRAMUSCULAR; INTRAVENOUS at 08:25

## 2018-04-02 RX ADMIN — SENNOSIDES, DOCUSATE SODIUM SCH EACH: 50; 8.6 TABLET, FILM COATED ORAL at 18:40

## 2018-04-02 RX ADMIN — MORPHINE SULFATE PRN MG: 10 INJECTION INTRAMUSCULAR; INTRAVENOUS; SUBCUTANEOUS at 00:27

## 2018-04-02 RX ADMIN — Medication SCH ML: at 13:09

## 2018-04-02 RX ADMIN — SENNOSIDES, DOCUSATE SODIUM SCH EACH: 50; 8.6 TABLET, FILM COATED ORAL at 11:58

## 2018-04-02 NOTE — PDOC PROGRESS REPORT
Subjective


Progress Note for:: 04/02/18


Subjective:: 





Patient out of bed to the chair today.  States pain has improved but continues 

to have considerable soreness.  Denies fever chills or sweats.


Reason For Visit: 


RIGHT OPEN RIMALLEOLAR ANKLE FRACTURE








Physical Exam


Vital Signs: 


 











Temp Pulse Resp BP Pulse Ox


 


 102.8 F H  106 H  17   126/67 H  97 


 


 04/02/18 07:19  04/02/18 07:19  04/02/18 07:19  04/02/18 07:19  04/02/18 07:19








 Intake & Output











 04/01/18 04/02/18 04/03/18





 06:59 06:59 06:59


 


Intake Total 5514 3190 


 


Output Total 4128 0945 


 


Balance 1453 -435 











Musculoskeletal exam: PRESENT: other - Right lower extremity: Splint intact.  

Intact flexion extension of the toes.  Cap refill less than 2 seconds.  Intact 

sensation.





Results


Laboratory Results: 


 





 04/02/18 06:12 





 04/01/18 06:40 





 











  04/02/18





  06:12


 


WBC  11.8 H


 


RBC  3.80


 


Hgb  11.8 L


 


Hct  35.1 L


 


MCV  92


 


MCH  31.1


 


MCHC  33.7


 


RDW  12.8


 


Plt Count  186








 











  04/01/18 04/01/18 04/01/18





  00:30 00:30 06:40


 


Creatine Kinase  146 H   244 H


 


CK-MB (CK-2)   1.24 


 


Troponin I   < 0.012 














  04/01/18 04/01/18 04/01/18





  06:40 12:47 12:47


 


Creatine Kinase   228 H 


 


CK-MB (CK-2)  1.70   1.39


 


Troponin I  < 0.012   < 0.012











Impressions: 


 





Ankle X-Ray  03/31/18 17:32


IMPRESSION:  There is evidence of right posterior malleolar and medial 

malleolar fracture with comminuted fracture distal right fibula.  

Posterolateral dislocation of the right ankle.  Plantar calcaneal bone spur.


 














Assessment & Plan





- Diagnosis


(1) Fracture of ankle, trimalleolar, right, open


Qualifiers: 


   Encounter type: initial encounter   Open fracture type: open type I or II   

Qualified Code(s): S82.851B - Displaced trimalleolar fracture of right lower leg

, initial encounter for open fracture type I or II   


Is this a current diagnosis for this admission?: Yes   


Plan: 


Postop day #2 status post irrigation debridement with ORIF right ankle fracture





#1 physical therapy nonweightbearing


#2 pain control


#3 Xarelto for DVT prophylaxis


#4 discharge planning anticipate discharge home tomorrow.

## 2018-04-03 VITALS — DIASTOLIC BLOOD PRESSURE: 52 MMHG | SYSTOLIC BLOOD PRESSURE: 94 MMHG

## 2018-04-03 LAB
ERYTHROCYTE [DISTWIDTH] IN BLOOD BY AUTOMATED COUNT: 12.4 % (ref 11.5–14)
HCT VFR BLD CALC: 30.3 % (ref 36–47)
HGB BLD-MCNC: 10.3 G/DL (ref 12–15.5)
MCH RBC QN AUTO: 31.8 PG (ref 27–33.4)
MCHC RBC AUTO-ENTMCNC: 34.2 G/DL (ref 32–36)
MCV RBC AUTO: 93 FL (ref 80–97)
PLATELET # BLD: 131 10^3/UL (ref 150–450)
RBC # BLD AUTO: 3.25 10^6/UL (ref 3.72–5.28)
WBC # BLD AUTO: 8.9 10^3/UL (ref 4–10.5)

## 2018-04-03 RX ADMIN — Medication SCH CAP: at 10:05

## 2018-04-03 RX ADMIN — CITALOPRAM HYDROBROMIDE SCH MG: 20 TABLET ORAL at 10:05

## 2018-04-03 RX ADMIN — PREGABALIN SCH MG: 75 CAPSULE ORAL at 10:05

## 2018-04-03 RX ADMIN — Medication SCH ML: at 07:00

## 2018-04-03 RX ADMIN — LANSOPRAZOLE SCH MG: 30 TABLET, ORALLY DISINTEGRATING, DELAYED RELEASE ORAL at 07:00

## 2018-04-03 RX ADMIN — ACETAMINOPHEN PRN MG: 325 TABLET ORAL at 08:08

## 2018-04-03 RX ADMIN — OXYCODONE HYDROCHLORIDE PRN MG: 5 TABLET ORAL at 08:07

## 2018-04-03 RX ADMIN — CEFAZOLIN SODIUM SCH ML: 2 SOLUTION INTRAVENOUS at 07:00

## 2018-04-03 RX ADMIN — SENNOSIDES, DOCUSATE SODIUM SCH EACH: 50; 8.6 TABLET, FILM COATED ORAL at 10:05

## 2018-04-03 NOTE — PDOC DISCHARGE SUMMARY
General





- Admit/Disc Date/PCP


Admission Date/Primary Care Provider: 


  03/31/18 19:52





  OTTO BASHIR PA-C





Discharge Date: 04/03/18





- Discharge Diagnosis


(1) Fracture of ankle, trimalleolar, right, open


Is this a current diagnosis for this admission?: Yes   





- Additional Information


Resuscitation Status: Full Code


Discharge Diet: As Tolerated


Discharge Activity: Balance Activity w/Rest, No Lifting Over 10 Pounds, No 

Lifting/Push/Pulling


Home Medications: 








Alprazolam [Xanax 0.25 mg Tablet] 0.25 mg PO DAILYP PRN 04/01/18 


Citalopram Hydrobromide [Celexa 20 mg Tablet] 20 mg PO DAILY 04/01/18 


Esomeprazole Magnesium [Nexium] 20 mg PO Q6AM 04/01/18 


Multivitamin [Multiple Vitamins] 1 each PO DAILY 04/01/18 











History of Present Illness


Patient complains of: Right ankle injury


History of Present Illness: 


CELINE JACOME is a 62 year old female who tripped going downstairs.  Patient 

had notable deformity of her right lower extremity with an open wound.  She was 

brought to the emergency room where x-rays demonstrated open fracture 

dislocation of the ankle.  Patient immediately received antibiotics the wound 

was cleansed and a soft wet-to-dry dressing placed.  Patient states her pain 

has improved after pain medication but has considerable pain with any attempted 

motion.  Notes tingling along the tips of the toes.  Pain 5/5.








Hospital Course


Hospital Course: 





Patient was admitted to the orthopedic service on 3/31/18 and underwent 

emergent operative intervention of her open fracture dislocation on 3/31/18.  

Patient had been doing well postoperatively did have intermittent fevers which 

have improved.  White count has been trending is currently normal at 8.9.  The 

patient denies chest pain shortness breath nausea vomiting or diarrhea.  She 

has been undergoing physical therapy with improvement.  Throughout the patient'

s hospital course she has progressed appropriately.  Patient has received 72 

hours of IV antibiotics and thus on 4/3/18 patient is stable for discharge.





Physical Exam


Vital Signs: 


 











Temp Pulse Resp BP Pulse Ox


 


 99.0 F   92   14   119/62   92 


 


 04/03/18 04:00  04/03/18 04:00  04/03/18 04:00  04/03/18 04:00  04/03/18 04:00








 Intake & Output











 04/02/18 04/03/18 04/04/18





 06:59 06:59 06:59


 


Intake Total 3190 1839 


 


Output Total 3625 1250 


 


Balance -435 589 











General appearance: PRESENT: no acute distress, well-developed, well-nourished


Head exam: PRESENT: atraumatic, normocephalic


Eye exam: PRESENT: conjunctiva pink, EOMI, PERRLA.  ABSENT: scleral icterus


Ear exam: PRESENT: normal external ear exam


Mouth exam: PRESENT: moist, tongue midline


Neck exam: PRESENT: full ROM.  ABSENT: carotid bruit, JVD, lymphadenopathy, 

thyromegaly


Cardiovascular exam: PRESENT: RRR.  ABSENT: diastolic murmur, rubs, systolic 

murmur


Pulses: PRESENT: normal dorsalis pedis pul, +2 pedal pulses bilateral


Vascular exam: PRESENT: normal capillary refill


GI/Abdominal exam: PRESENT: normal bowel sounds, soft.  ABSENT: distended, 

guarding, mass, organolmegaly, rebound, tenderness


Rectal exam: PRESENT: deferred


Musculoskeletal exam: PRESENT: other - Right lower extremity: Splint clean/dry/

intact no erythema or drainage.  No pain with passive stretch.  Cap refill less 

than 2 seconds.  Intact flexion/extension of the toes.


Neurological exam: PRESENT: alert, awake, oriented to person, oriented to place

, oriented to time, oriented to situation, CN II-XII grossly intact.  ABSENT: 

motor sensory deficit


Psychiatric exam: PRESENT: appropriate affect, normal mood.  ABSENT: homicidal 

ideation, suicidal ideation


Skin exam: PRESENT: dry, intact, warm.  ABSENT: cyanosis, rash





Results


Laboratory Results: 


 





 04/03/18 04:27 





 04/01/18 06:40 





 











  04/03/18





  04:27


 


WBC  8.9


 


RBC  3.25 L


 


Hgb  10.3 L


 


Hct  30.3 L


 


MCV  93


 


MCH  31.8


 


MCHC  34.2


 


RDW  12.4


 


Plt Count  131 L








 











  04/01/18 04/01/18 04/01/18





  00:30 00:30 06:40


 


Creatine Kinase  146 H   244 H


 


CK-MB (CK-2)   1.24 


 


Troponin I   < 0.012 














  04/01/18 04/01/18 04/01/18





  06:40 12:47 12:47


 


Creatine Kinase   228 H 


 


CK-MB (CK-2)  1.70   1.39


 


Troponin I  < 0.012   < 0.012











Impressions: 


 





Ankle X-Ray  03/31/18 17:32


IMPRESSION:  There is evidence of right posterior malleolar and medial 

malleolar fracture with comminuted fracture distal right fibula.  

Posterolateral dislocation of the right ankle.  Plantar calcaneal bone spur.


 














Qualifiers





- *


**PATEINT BEING DISCHARGED WITH ANY OF THE FOLLOWING DIAGNOSIS?: No


VTE patient discharged on overlapping Therapy?: Yes





Plan


Discharge Plan: 





Patient has progressed appropriate throughout hospital course.  She will 

continue nonweightbearing in the right lower extremity.  Plan will be for follow

-up in 7 days for wound check and will obtain radiographs at that time.  

Patient will be discharged on Xarelto for DVT prophylaxis along with 

clindamycin for bacterial prophylaxis given the open nature of her fracture.  

Patient is to call the office for any questions and concerns including 

increasing pain, redness, temperature greater than 101.5.  Patient was read the 

discharge instructions understood the discharge instructions and is 

orthopedically stable for discharge to home on 4/3/18.

## 2018-06-26 LAB
ANION GAP SERPL CALC-SCNC: 14 MMOL/L (ref 5–19)
APPEARANCE UR: CLEAR
APTT PPP: YELLOW S
BILIRUB UR QL STRIP: NEGATIVE
BUN SERPL-MCNC: 18 MG/DL (ref 7–20)
CALCIUM: 9.3 MG/DL (ref 8.4–10.2)
CHLORIDE SERPL-SCNC: 102 MMOL/L (ref 98–107)
CO2 SERPL-SCNC: 26 MMOL/L (ref 22–30)
ERYTHROCYTE [DISTWIDTH] IN BLOOD BY AUTOMATED COUNT: 14.6 % (ref 11.5–14)
GLUCOSE SERPL-MCNC: 101 MG/DL (ref 75–110)
GLUCOSE UR STRIP-MCNC: NEGATIVE MG/DL
HCT VFR BLD CALC: 40.8 % (ref 36–47)
HGB BLD-MCNC: 13.8 G/DL (ref 12–15.5)
KETONES UR STRIP-MCNC: NEGATIVE MG/DL
MCH RBC QN AUTO: 30.5 PG (ref 27–33.4)
MCHC RBC AUTO-ENTMCNC: 33.8 G/DL (ref 32–36)
MCV RBC AUTO: 90 FL (ref 80–97)
NITRITE UR QL STRIP: NEGATIVE
PH UR STRIP: 6 [PH] (ref 5–9)
PLATELET # BLD: 163 10^3/UL (ref 150–450)
POTASSIUM SERPL-SCNC: 4.7 MMOL/L (ref 3.6–5)
PROT UR STRIP-MCNC: NEGATIVE MG/DL
RBC # BLD AUTO: 4.52 10^6/UL (ref 3.72–5.28)
SODIUM SERPL-SCNC: 141.5 MMOL/L (ref 137–145)
SP GR UR STRIP: 1.02
UROBILINOGEN UR-MCNC: 2 MG/DL (ref ?–2)
WBC # BLD AUTO: 6.7 10^3/UL (ref 4–10.5)

## 2018-06-26 NOTE — RADIOLOGY REPORT (SQ)
EXAM DESCRIPTION:  CHEST PA/LATERAL



COMPLETED DATE/TIME:  6/26/2018 9:48 am



REASON FOR STUDY:  PRE-OP



COMPARISON:  None.



EXAM PARAMETERS:  NUMBER OF VIEWS: two views

TECHNIQUE: Digital Frontal and Lateral radiographic views of the chest acquired.

RADIATION DOSE: NA

LIMITATIONS: none



FINDINGS:  LUNGS AND PLEURA: No opacities, masses or pneumothorax. No pleural effusion.

MEDIASTINUM AND HILAR STRUCTURES: No masses or contour abnormalities.

HEART AND VASCULAR STRUCTURES: Heart normal size.  No evidence for failure.

BONES: No acute findings.

HARDWARE: Clips right upper quadrant post cholecystectomy.

OTHER: No other significant finding.



IMPRESSION:  NO SIGNIFICANT RADIOGRAPHIC FINDING IN THE CHEST.



TECHNICAL DOCUMENTATION:  JOB ID:  [id number]

 2011 [employer]- All Rights Reserved



Reading location - IP/workstation name: [email or web address]

## 2018-06-26 NOTE — EKG REPORT
SEVERITY:- OTHERWISE NORMAL ECG -

SINUS RHYTHM

BORDERLINE LEFT AXIS DEVIATION

:

Confirmed by: Yojana Monk MD 26-Jun-2018 19:42:07

## 2018-07-03 ENCOUNTER — HOSPITAL ENCOUNTER (OUTPATIENT)
Dept: HOSPITAL 62 - OROUT | Age: 63
Discharge: HOME | End: 2018-07-03
Attending: ORTHOPAEDIC SURGERY
Payer: COMMERCIAL

## 2018-07-03 VITALS — SYSTOLIC BLOOD PRESSURE: 122 MMHG | DIASTOLIC BLOOD PRESSURE: 89 MMHG

## 2018-07-03 DIAGNOSIS — Z79.899: ICD-10-CM

## 2018-07-03 DIAGNOSIS — M25.571: ICD-10-CM
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
## 2018-07-03 DIAGNOSIS — S82.851B: Primary | ICD-10-CM

## 2018-07-03 PROCEDURE — 93010 ELECTROCARDIOGRAM REPORT: CPT

## 2018-07-03 PROCEDURE — 81001 URINALYSIS AUTO W/SCOPE: CPT

## 2018-07-03 PROCEDURE — 85027 COMPLETE CBC AUTOMATED: CPT

## 2018-07-03 PROCEDURE — 36415 COLL VENOUS BLD VENIPUNCTURE: CPT

## 2018-07-03 PROCEDURE — 20680 REMOVAL OF IMPLANT DEEP: CPT

## 2018-07-03 PROCEDURE — 93005 ELECTROCARDIOGRAM TRACING: CPT

## 2018-07-03 PROCEDURE — 71046 X-RAY EXAM CHEST 2 VIEWS: CPT

## 2018-07-03 PROCEDURE — 80048 BASIC METABOLIC PNL TOTAL CA: CPT

## 2018-07-03 PROCEDURE — 73600 X-RAY EXAM OF ANKLE: CPT

## 2018-07-03 PROCEDURE — 01480 ANES OPEN PX LOWER L/A/F NOS: CPT

## 2018-07-03 NOTE — RADIOLOGY REPORT (SQ)
EXAM DESCRIPTION:  ANKLE RIGHT AP/LATERAL; NO CHG FLUORO



COMPLETED DATE/TIME:  7/3/2018 8:57 am



REASON FOR STUDY:  RIGHT ANKLE HARDWARE REMOVAL S82.851B  DISPLACED TRIMALLEOL FX R LOW LEG, INIT FOR
 OPN FX  M25.571  PAIN IN RIGHT ANKLE AND JOINTS OF RIGHT FOOT



COMPARISON:  Intra operative imaging right ankle 1/30/2018



FLUOROSCOPY TIME:  20 seconds

2 digital radiographic images saved to PACS.



TECHNIQUE:  Intra-operative images acquired during surgical procedure to evaluate progress.

NUMBER OF IMAGES: 2 digital C-arm images



LIMITATIONS:  None.



FINDINGS:  10 digital C-arm images are submitted.  There has been removal of the syndesmosis screws a
cross the distal tibiofibular joint.  Other hardware along the distal fibula and medial malleolus unc
hanged.  Please see the operative report for further details



IMPRESSION:  Intra procedural imaging and fluoro



COMMENT:  Quality :  Final reports for procedures using fluoroscopy that document radiation exp
osure indices, or exposure time and number of fluorographic images (if radiation exposure indices are
 not available)

Please consult full operative report of the attending physician for description of the procedure.



TECHNICAL DOCUMENTATION:  JOB ID:  [id number]

 2011 [employer]- All Rights Reserved



Reading location - IP/workstation name: [email or web address]

## 2018-07-03 NOTE — RADIOLOGY REPORT (SQ)
EXAM DESCRIPTION:  ANKLE RIGHT AP/LATERAL; NO CHG FLUORO



COMPLETED DATE/TIME:  7/3/2018 8:57 am



REASON FOR STUDY:  RIGHT ANKLE HARDWARE REMOVAL S82.851B  DISPLACED TRIMALLEOL FX R LOW LEG, INIT FOR
 OPN FX  M25.571  PAIN IN RIGHT ANKLE AND JOINTS OF RIGHT FOOT



COMPARISON:  Intra operative imaging right ankle 1/30/2018



FLUOROSCOPY TIME:  20 seconds

2 digital radiographic images saved to PACS.



TECHNIQUE:  Intra-operative images acquired during surgical procedure to evaluate progress.

NUMBER OF IMAGES: 2 digital C-arm images



LIMITATIONS:  None.



FINDINGS:  10 digital C-arm images are submitted.  There has been removal of the syndesmosis screws a
cross the distal tibiofibular joint.  Other hardware along the distal fibula and medial malleolus unc
hanged.  Please see the operative report for further details



IMPRESSION:  Intra procedural imaging and fluoro



COMMENT:  Quality :  Final reports for procedures using fluoroscopy that document radiation exp
osure indices, or exposure time and number of fluorographic images (if radiation exposure indices are
 not available)

Please consult full operative report of the attending physician for description of the procedure.



TECHNICAL DOCUMENTATION:  JOB ID:  [id number]

 2011 [employer]- All Rights Reserved



Reading location - IP/workstation name: [email or web address]

## 2018-07-14 NOTE — OPERATIVE REPORT
Operative Report


DATE OF SURGERY: 07/03/18


PREOPERATIVE DIAGNOSIS: Status post ORIF right ankle with syndesmotic fixation, 

painful hardware


POSTOPERATIVE DIAGNOSIS: Same


OPERATION: Removal of hardware right ankle


SURGEON: FERCHO SAMUEL


ANESTHESIA: LMAC


COMPLICATIONS: 





None


ESTIMATED BLOOD LOSS: Minimal


PROCEDURE: 





Indication for above procedure:





62-year-old female who sustained an open fracture dislocation of her right 

ankle underwent successful open reduction internal fixation with syndesmotic 

fixation.  Patient progressed appropriately but continued to have some mild 

discomfort along the screw sites of the syndesmotic screws and thus prior to 

hardware breakage decision was made to proceed with operative intervention and 

screw removal.  Risks and benefits were explained to the patient patient 

verbalized understanding consented for the procedure.





Procedure In Detail:





Patient was seen and evaluated in the preoperative holding area.  The right 

lower extremity was initialized and marked.  Patient received 2g of Ancef IV 

for bacterial prophylaxis.  Patient was taken back to the operative room where 

transferred to the operative table and placed under  anesthesia.  Once they 

were adequately anesthetized a nonsterile tourniquet was placed on the right 

lower extremity.  A surgical team debriefing was performed ensuring all 

instrumentation was available, the surgical procedure was discussed with 

possible concerns reviewed.  The upper extremity was prepped with ChloraPrep 

and draped in a sterile fashion.   A timeout was done identifying correct 

patient, procedure and extremity everyone in attendance agree with this and 

verbalized no concerns.  Local block was performed utilizing 10 cc of 1% 

lidocaine without epinephrine.  The extremity was exsanguinated the tourniquet 

was inflated to 300 mmHg.





Via C-arm fluoroscopy the site of the 2 syndesmotic screws was identified.  

Small stab incision was made overlying the screw heads.  The screws were then 

removed there is no evidence of hardware breakage.  C-arm fluoroscopy was 

obtained confirming stability of the syndesmosis.  There is no evidence of tibia

-fibula clear space widening on external rotation stress.  Wound was copiously 

irrigated with normal saline.  Skin was closed with interrupted 3-0 nylon 

sutures and a soft dressing was placed.  Tourniquet was deflated.  Patient had 

good peripheral perfusion.





Sponge counts, instrument counts, needle counts counts were correct.  Patient 

was then awoken from anesthesia.  Transferred from the operating room table to 

the operating room stretcher.  There was no intraoperative complications 

patient tolerated procedure well stable to PACU.





Postoperative plan:





Patient will begin weightbearing as tolerated.  We will proceed with suture 

removal at follow-up.

## 2018-07-14 NOTE — DISCHARGE SUMMARY
Discharge Summary (SDC)





- Discharge


Final Diagnosis: 





Right ankle fracture dislocation


Date of Surgery: 07/03/18


Discharge Date: 07/03/18


Condition: Good


Treatment or Instructions: 


Schedule Follow Up w/ Dr. Brian Forrest @ [facility] to be seen 

in 10-14 days or as scheduled


[place]: [phone] 


[place]: [phone]


[place]: [phone] 


May remove dressing on postop day #3, keep incision covered and dry.


Ice and elevate


May begin weightbearing as tolerated.


Stool softener of choice when on pain medication.


Prescriptions: 


Hydrocodone/Acetaminophen [Norco 5-325 mg Tablet] 1 tab PO Q6 PRN #10 tablet


 PRN Reason: 


Referrals: 


OTTO BASHIR PA-C [Primary Care Provider] - 


Discharge Diet: As Tolerated


Respiratory Treatments at Home: Deep Breathing/Coughing, Incentive Spirometer


Discharge Activity: No Lifting Over 10 Pounds, No Lifting/Push/Pulling


Report the Following to Your Physician Immediately: Fever over 101 Degrees, 

Unusual Bleeding, Redness, Swelling, Warmth

## 2018-11-21 ENCOUNTER — OFFICE VISIT (OUTPATIENT)
Dept: URGENT CARE | Facility: MEDICAL CENTER | Age: 63
End: 2018-11-21
Payer: COMMERCIAL

## 2018-11-21 VITALS
TEMPERATURE: 96.6 F | DIASTOLIC BLOOD PRESSURE: 89 MMHG | HEART RATE: 75 BPM | OXYGEN SATURATION: 98 % | BODY MASS INDEX: 28.68 KG/M2 | HEIGHT: 64 IN | RESPIRATION RATE: 16 BRPM | SYSTOLIC BLOOD PRESSURE: 145 MMHG | WEIGHT: 168 LBS

## 2018-11-21 DIAGNOSIS — J20.9 ACUTE BRONCHITIS, UNSPECIFIED ORGANISM: Primary | ICD-10-CM

## 2018-11-21 PROCEDURE — 99203 OFFICE O/P NEW LOW 30 MIN: CPT | Performed by: PHYSICIAN ASSISTANT

## 2018-11-21 RX ORDER — PREDNISONE 20 MG/1
40 TABLET ORAL DAILY
Qty: 10 TABLET | Refills: 0 | Status: SHIPPED | OUTPATIENT
Start: 2018-11-21 | End: 2018-11-26

## 2018-11-21 RX ORDER — AMOXICILLIN AND CLAVULANATE POTASSIUM 875; 125 MG/1; MG/1
1 TABLET, FILM COATED ORAL EVERY 12 HOURS SCHEDULED
Qty: 14 TABLET | Refills: 0 | Status: SHIPPED | OUTPATIENT
Start: 2018-11-21 | End: 2018-11-28

## 2018-11-21 RX ORDER — CITALOPRAM 20 MG/1
10 TABLET ORAL DAILY
COMMUNITY

## 2018-11-21 RX ORDER — ALPRAZOLAM 0.25 MG/1
TABLET ORAL
COMMUNITY

## 2018-11-21 NOTE — PROGRESS NOTES
[address] [facility]        NAME: Randy Heart is a 65 y o  female  : 1955    MRN: [id number]  DATE: 2018  TIME: 12:24 PM    Assessment and Plan   Acute bronchitis, unspecified organism [J20 [id number]]  1  Acute bronchitis, unspecified organism  predniSONE 20 mg tablet    amoxicillin-clavulanate (AUGMENTIN) 875-125 mg per tablet         Patient Instructions       Follow up with PCP in 3-5 days  Proceed to  ER if symptoms worsen  Chief Complaint     Chief Complaint   Patient presents with    Cold Like Symptoms     Patient relates has been sick for 2 weeks with sinus pain, pressure and congestion  Denies fever  + slight cough  History of Present Illness       Cough   This is a new problem  Episode onset: 3 weeks ago  The problem has been gradually worsening  The problem occurs every few minutes  The cough is productive of brown sputum  Associated symptoms include postnasal drip and a sore throat  Pertinent negatives include no chest pain, chills, ear pain, fever, headaches, myalgias, rash, rhinorrhea, shortness of breath or wheezing  Nothing aggravates the symptoms  Treatments tried: sudafed, mucinex, flonase, tylenol  The treatment provided no relief  Her past medical history is significant for bronchitis  Review of Systems   Review of Systems   Constitutional: Negative for chills, diaphoresis, fatigue and fever  HENT: Positive for postnasal drip, sinus pain, sinus pressure, sneezing and sore throat  Negative for congestion, ear pain, rhinorrhea and voice change  Eyes: Negative  Respiratory: Positive for cough  Negative for chest tightness, shortness of breath and wheezing  Cardiovascular: Negative for chest pain and palpitations  Gastrointestinal: Negative for abdominal pain, constipation, diarrhea, nausea and vomiting  Endocrine: Negative  Genitourinary: Negative for dysuria  Musculoskeletal: Negative for back pain, myalgias and neck pain     Skin: Negative for pallor and rash  Allergic/Immunologic: Negative  Neurological: Negative for dizziness, syncope and headaches  Hematological: Negative  Psychiatric/Behavioral: Negative  Current Medications       Current Outpatient Prescriptions:     ALPRAZolam (XANAX) 0 25 mg tablet, Take by mouth daily at bedtime as needed for anxiety, Disp: , Rfl:     citalopram (CeleXA) 20 mg tablet, Take 10 mg by mouth daily, Disp: , Rfl:     amoxicillin-clavulanate (AUGMENTIN) 875-125 mg per tablet, Take 1 tablet by mouth every 12 (twelve) hours for 7 days, Disp: 14 tablet, Rfl: 0    predniSONE 20 mg tablet, Take 2 tablets (40 mg total) by mouth daily for 5 days, Disp: 10 tablet, Rfl: 0    Current Allergies     Allergies as of 2018    (No Known Allergies)            The following portions of the patient's history were reviewed and updated as appropriate: allergies, current medications, past family history, past medical history, past social history, past surgical history and problem list      Past Medical History:   Diagnosis Date    Anxiety        Past Surgical History:   Procedure Laterality Date     SECTION      OOPHORECTOMY Right        No family history on file  Medications have been verified  Objective   /89   Pulse 75   Temp (!) 96 6 °F (35 9 °C) (Tympanic)   Resp 16   Ht 5' 4" (1 626 m)   Wt 76 2 kg (168 lb)   SpO2 98%   BMI 28 84 kg/m²        Physical Exam     Physical Exam   Constitutional: She appears well-developed and well-nourished  No distress  HENT:   Head: Normocephalic and atraumatic  Right Ear: External ear normal    Left Ear: External ear normal    TM intact and pearly bilaterally  Clear nasal discharge bilaterally  Swollen turbinates  Postnasal discharge and erythematous posterior pharynx  Eyes: Conjunctivae are normal  Right eye exhibits no discharge  Left eye exhibits no discharge  Neck: Normal range of motion  Neck supple     Cardiovascular: Normal rate, regular rhythm, normal heart sounds and intact distal pulses  Pulmonary/Chest: Effort normal and breath sounds normal  No respiratory distress  She has no wheezes  She has no rales  Lymphadenopathy:     She has no cervical adenopathy  Skin: Skin is warm  No rash noted  She is not diaphoretic  Nursing note and vitals reviewed

## 2018-11-21 NOTE — PATIENT INSTRUCTIONS
Take medications as directed  Drink plenty of fluids  Follow up with family doctor this week  Go to ER immediately if new or worsening symptoms occur  Acute Bronchitis   WHAT YOU NEED TO KNOW:   Acute bronchitis is swelling and irritation in the air passages of your lungs  This irritation may cause you to cough or have other breathing problems  Acute bronchitis often starts because of another illness, such as a cold or the flu  The illness spreads from your nose and throat to your windpipe and airways  Bronchitis is often called a chest cold  Acute bronchitis lasts about 3 to 6 weeks and is usually not a serious illness  Your cough can last for several weeks  DISCHARGE INSTRUCTIONS:   Return to the emergency department if:   · You cough up blood  · Your lips or fingernails turn blue  · You feel like you are not getting enough air when you breathe  Contact your healthcare provider if:   · You have a fever  · Your breathing problems do not go away or get worse  · Your cough does not get better within 4 weeks  · You have questions or concerns about your condition or care  Self-care:   · Get more rest   Rest helps your body to heal  Slowly start to do more each day  Rest when you feel it is needed  · Avoid irritants in the air  Avoid chemicals, fumes, and dust  Wear a face mask if you must work around dust or fumes  Stay inside on days when air pollution levels are high  If you have allergies, stay inside when pollen counts are high  Do not use aerosol products, such as spray-on deodorant, bug spray, and hair spray  · Do not smoke or be around others who smoke  Nicotine and other chemicals in cigarettes and cigars damages the cilia that move mucus out of your lungs  Ask your healthcare provider for information if you currently smoke and need help to quit  E-cigarettes or smokeless tobacco still contain nicotine  Talk to your healthcare provider before you use these products       · Drink liquids as directed  Liquids help keep your air passages moist and help you cough up mucus  You may need to drink more liquids when you have acute bronchitis  Ask how much liquid to drink each day and which liquids are best for you  · Use a humidifier or vaporizer  Use a cool mist humidifier or a vaporizer to increase air moisture in your home  This may make it easier for you to breathe and help decrease your cough  Decrease risk for acute bronchitis:   · Get the vaccinations you need  Ask your healthcare provider if you should get vaccinated against the flu or pneumonia  · Prevent the spread of germs  You can decrease your risk of acute bronchitis and other illnesses by doing the following:     [facility] [place] your hands often with soap and water  Carry germ-killing hand lotion or gel with you  You can use the lotion or gel to clean your hands when soap and water are not available  ¨ Do not touch your eyes, nose, or mouth unless you have washed your hands first     ¨ Always cover your mouth when you cough to prevent the spread of germs  It is best to cough into a tissue or your shirt sleeve instead of into your hand  Ask those around you cover their mouths when they cough  ¨ Try to avoid people who have a cold or the flu  If you are sick, stay away from others as much as possible  Medicines: Your healthcare provider may  give you any of the following:  · Ibuprofen or acetaminophen  are medicines that help lower your fever  They are available without a doctor's order  Ask your healthcare provider which medicine is right for you  Ask how much to take and how often to take it  Follow directions  These medicines can cause stomach bleeding if not taken correctly  Ibuprofen can cause kidney damage  Do not take ibuprofen if you have kidney disease, an ulcer, or allergies to aspirin  Acetaminophen can cause liver damage  Do not take more than 4,000 milligrams in 24 hours       · Decongestants  help loosen mucus in your lungs and make it easier to cough up  This can help you breathe easier  · Cough suppressants  decrease your urge to cough  If your cough produces mucus, do not take a cough suppressant unless your healthcare provider tells you to  Your healthcare provider may suggest that you take a cough suppressant at night so you can rest     · Inhalers  may be given  Your healthcare provider may give you one or more inhalers to help you breathe easier and cough less  An inhaler gives your medicine to open your airways  Ask your healthcare provider to show you how to use your inhaler correctly  · Take your medicine as directed  Contact your healthcare provider if you think your medicine is not helping or if you have side effects  Tell him of her if you are allergic to any medicine  Keep a list of the medicines, vitamins, and herbs you take  Include the amounts, and when and why you take them  Bring the list or the pill bottles to follow-up visits  Carry your medicine list with you in case of an emergency  Follow up with your healthcare provider as directed:  Write down questions you have so you will remember to ask them during your follow-up visits  © 2017 [address] [employer] Information is for End User's use only and may not be sold, redistributed or otherwise used for commercial purposes  All illustrations and images included in CareNotes® are the copyrighted property of [employer]  or [employer]iqu[employer]  The above information is an  only  It is not intended as medical advice for individual conditions or treatments  Talk to your doctor, nurse or pharmacist before following any medical regimen to see if it is safe and effective for you